# Patient Record
Sex: FEMALE | Race: WHITE | NOT HISPANIC OR LATINO | URBAN - METROPOLITAN AREA
[De-identification: names, ages, dates, MRNs, and addresses within clinical notes are randomized per-mention and may not be internally consistent; named-entity substitution may affect disease eponyms.]

---

## 2021-02-17 ENCOUNTER — OUTPATIENT (OUTPATIENT)
Dept: OUTPATIENT SERVICES | Facility: HOSPITAL | Age: 86
LOS: 1 days | End: 2021-02-17
Payer: MEDICARE

## 2021-02-17 DIAGNOSIS — R13.19 OTHER DYSPHAGIA: ICD-10-CM

## 2021-02-17 PROCEDURE — 74240 X-RAY XM UPR GI TRC 1CNTRST: CPT

## 2021-02-17 PROCEDURE — 74240 X-RAY XM UPR GI TRC 1CNTRST: CPT | Mod: 26

## 2022-01-18 ENCOUNTER — EMERGENCY (EMERGENCY)
Facility: HOSPITAL | Age: 87
LOS: 1 days | Discharge: HOME | End: 2022-01-18
Attending: EMERGENCY MEDICINE
Payer: MEDICARE

## 2022-01-18 VITALS
DIASTOLIC BLOOD PRESSURE: 79 MMHG | WEIGHT: 117.95 LBS | OXYGEN SATURATION: 66 % | TEMPERATURE: 98 F | RESPIRATION RATE: 16 BRPM | SYSTOLIC BLOOD PRESSURE: 174 MMHG | HEART RATE: 68 BPM | HEIGHT: 58 IN

## 2022-01-18 LAB
ALBUMIN SERPL ELPH-MCNC: 3.7 G/DL — SIGNIFICANT CHANGE UP (ref 3.3–5.2)
ALP SERPL-CCNC: 76 U/L — SIGNIFICANT CHANGE UP (ref 40–120)
ALT FLD-CCNC: 16 U/L — SIGNIFICANT CHANGE UP
ANION GAP SERPL CALC-SCNC: 15 MMOL/L — SIGNIFICANT CHANGE UP (ref 5–17)
AST SERPL-CCNC: 15 U/L — SIGNIFICANT CHANGE UP
BASOPHILS # BLD AUTO: 0.07 K/UL — SIGNIFICANT CHANGE UP (ref 0–0.2)
BASOPHILS NFR BLD AUTO: 1.1 % — SIGNIFICANT CHANGE UP (ref 0–2)
BILIRUB SERPL-MCNC: <0.2 MG/DL — LOW (ref 0.4–2)
BUN SERPL-MCNC: 33.9 MG/DL — HIGH (ref 8–20)
CALCIUM SERPL-MCNC: 8.9 MG/DL — SIGNIFICANT CHANGE UP (ref 8.6–10.2)
CHLORIDE SERPL-SCNC: 103 MMOL/L — SIGNIFICANT CHANGE UP (ref 98–107)
CO2 SERPL-SCNC: 24 MMOL/L — SIGNIFICANT CHANGE UP (ref 22–29)
CREAT SERPL-MCNC: 0.58 MG/DL — SIGNIFICANT CHANGE UP (ref 0.5–1.3)
EOSINOPHIL # BLD AUTO: 0.3 K/UL — SIGNIFICANT CHANGE UP (ref 0–0.5)
EOSINOPHIL NFR BLD AUTO: 4.7 % — SIGNIFICANT CHANGE UP (ref 0–6)
GLUCOSE SERPL-MCNC: 119 MG/DL — HIGH (ref 70–99)
HCT VFR BLD CALC: 33.9 % — LOW (ref 34.5–45)
HGB BLD-MCNC: 10.4 G/DL — LOW (ref 11.5–15.5)
IMM GRANULOCYTES NFR BLD AUTO: 0.6 % — SIGNIFICANT CHANGE UP (ref 0–1.5)
LYMPHOCYTES # BLD AUTO: 1.29 K/UL — SIGNIFICANT CHANGE UP (ref 1–3.3)
LYMPHOCYTES # BLD AUTO: 20 % — SIGNIFICANT CHANGE UP (ref 13–44)
MCHC RBC-ENTMCNC: 27.2 PG — SIGNIFICANT CHANGE UP (ref 27–34)
MCHC RBC-ENTMCNC: 30.7 GM/DL — LOW (ref 32–36)
MCV RBC AUTO: 88.5 FL — SIGNIFICANT CHANGE UP (ref 80–100)
MONOCYTES # BLD AUTO: 0.68 K/UL — SIGNIFICANT CHANGE UP (ref 0–0.9)
MONOCYTES NFR BLD AUTO: 10.5 % — SIGNIFICANT CHANGE UP (ref 2–14)
NEUTROPHILS # BLD AUTO: 4.07 K/UL — SIGNIFICANT CHANGE UP (ref 1.8–7.4)
NEUTROPHILS NFR BLD AUTO: 63.1 % — SIGNIFICANT CHANGE UP (ref 43–77)
PLATELET # BLD AUTO: 314 K/UL — SIGNIFICANT CHANGE UP (ref 150–400)
POTASSIUM SERPL-MCNC: 3.3 MMOL/L — LOW (ref 3.5–5.3)
POTASSIUM SERPL-SCNC: 3.3 MMOL/L — LOW (ref 3.5–5.3)
PROT SERPL-MCNC: 6.3 G/DL — LOW (ref 6.6–8.7)
RBC # BLD: 3.83 M/UL — SIGNIFICANT CHANGE UP (ref 3.8–5.2)
RBC # FLD: 15.5 % — HIGH (ref 10.3–14.5)
SODIUM SERPL-SCNC: 142 MMOL/L — SIGNIFICANT CHANGE UP (ref 135–145)
WBC # BLD: 6.45 K/UL — SIGNIFICANT CHANGE UP (ref 3.8–10.5)
WBC # FLD AUTO: 6.45 K/UL — SIGNIFICANT CHANGE UP (ref 3.8–10.5)

## 2022-01-18 PROCEDURE — 99284 EMERGENCY DEPT VISIT MOD MDM: CPT

## 2022-01-18 NOTE — ED PROVIDER NOTE - CARE PROVIDER_API CALL
Osman Mckeon)  Gastroenterology; Internal Medicine  [address], [address]  [place] [address]  Phone: [phone]  Fax: [phone]  Follow Up Time: 4-6 Days    Nick Taylor)  Surgery  General  [address] [place] [address]  Phone: [phone]  Fax: [phone]  Follow Up Time: 4-6 Days

## 2022-01-18 NOTE — ED PROVIDER NOTE - PATIENT PORTAL LINK FT
You can access the FollowMyHealth Patient Portal offered by [facility] by registering at the following website: [email or web address]. By joining [facility]’s FollowMyHealth portal, you will also be able to view your health information using other applications (apps) compatible with our system.

## 2022-01-18 NOTE — ED PROVIDER NOTE - PROVIDER TOKENS
PROVIDER:[TOKEN:[[id number]],FOLLOWUP:[4-6 Days]],PROVIDER:[TOKEN:[[id number]],FOLLOWUP:[4-6 Days]]

## 2022-01-18 NOTE — ED PROVIDER NOTE - PROGRESS NOTE DETAILS
Shivani GARIBAY: Leg significantly less swollen, DVT study negative. Ready for dc. Return precautions given.

## 2022-01-18 NOTE — ED PROVIDER NOTE - PHYSICAL EXAMINATION
Constitutional - well-developed. Head - NCAT. Airway patent. Eyes - PERRL. CV - RRR. no murmur. 2+ RLE swelling. nvi distally. Pulm - CTAB. Abd - soft, nt. no rebound. no guarding. Neuro - A&Ox3. strength 5/5 x4. sensation intact x4. normal gait. Skin - No rash. MSK - normal ROM.   Chaperone RN Erlinda - rectal with no blood on NANI. small abrasion perianally.  no external hemorrhoids.

## 2022-01-18 NOTE — ED PROVIDER NOTE - NSFOLLOWUPINSTRUCTIONS_ED_ALL_ED_FT
- Follow up with your doctor within 2-3 days.   - Bring results with you to the appointment.   - See information above for Gastroenterologist and Colorectal surgery.   - Return to the ED for any new or worsening symptoms.

## 2022-01-18 NOTE — ED PROVIDER NOTE - OBJECTIVE STATEMENT
Pt is an 85 yo F co rectal bleeding.  Pt's daughter states that for the past 3 wks patient has had blood in her diaper and when she has a BM and it has been progressively worsening.  the blood is bright red.  no clots.  no n/v. no fever.  Pt unable to provide any hx 2/2 dementia.  Pt's daughter also states that she noticed patient's R leg has been swollen recently.

## 2022-01-18 NOTE — ED PROVIDER NOTE - CLINICAL SUMMARY MEDICAL DECISION MAKING FREE TEXT BOX
labs reviewed.  Pt skin with breakdown likely causing bleeding but possibly internal hemorrhoids as well.  would recommend barrier cream and colace for d/c.  as for RLE edema.  US pending if neg to d/c. if positive plan to discuss r/b/a of AC in patient with local rectal bleeding and dementia with fall risk. Pt signed out to Dr. Parrish pending US

## 2022-01-18 NOTE — ED ADULT TRIAGE NOTE - CHIEF COMPLAINT QUOTE
Pt with rectal bleeding for the last few months that are getting worse and more frequent. She has needed iron infusions in the past. Daughter spoke to her GI doctor and was told to bring her here for evaluation. Pt's only complaint is weakness. Daughter states she is pale the last few days.

## 2022-01-19 VITALS
RESPIRATION RATE: 18 BRPM | HEART RATE: 72 BPM | OXYGEN SATURATION: 98 % | DIASTOLIC BLOOD PRESSURE: 76 MMHG | TEMPERATURE: 98 F | SYSTOLIC BLOOD PRESSURE: 141 MMHG

## 2022-01-19 PROBLEM — Z00.00 ENCOUNTER FOR PREVENTIVE HEALTH EXAMINATION: Status: ACTIVE | Noted: 2022-01-19

## 2022-01-19 PROCEDURE — 93971 EXTREMITY STUDY: CPT | Mod: 26,RT

## 2022-01-19 PROCEDURE — 86901 BLOOD TYPING SEROLOGIC RH(D): CPT

## 2022-01-19 PROCEDURE — 86900 BLOOD TYPING SEROLOGIC ABO: CPT

## 2022-01-19 PROCEDURE — 80053 COMPREHEN METABOLIC PANEL: CPT

## 2022-01-19 PROCEDURE — 36415 COLL VENOUS BLD VENIPUNCTURE: CPT

## 2022-01-19 PROCEDURE — 93971 EXTREMITY STUDY: CPT

## 2022-01-19 PROCEDURE — 85025 COMPLETE CBC W/AUTO DIFF WBC: CPT

## 2022-01-19 PROCEDURE — 99284 EMERGENCY DEPT VISIT MOD MDM: CPT | Mod: 25

## 2022-01-19 PROCEDURE — 86850 RBC ANTIBODY SCREEN: CPT

## 2022-01-21 ENCOUNTER — APPOINTMENT (OUTPATIENT)
Dept: COLORECTAL SURGERY | Facility: CLINIC | Age: 87
End: 2022-01-21
Payer: MEDICARE

## 2022-01-21 PROCEDURE — 99204 OFFICE O/P NEW MOD 45 MIN: CPT

## 2022-01-21 PROCEDURE — 46600 DIAGNOSTIC ANOSCOPY SPX: CPT

## 2022-01-21 RX ORDER — SIMVASTATIN 80 MG/1
TABLET, FILM COATED ORAL
Refills: 0 | Status: ACTIVE | COMMUNITY

## 2022-01-21 RX ORDER — METFORMIN HYDROCHLORIDE 625 MG/1
TABLET ORAL
Refills: 0 | Status: ACTIVE | COMMUNITY

## 2022-01-21 RX ORDER — DIAZEPAM 10 MG/2ML
INJECTION, SOLUTION INTRAMUSCULAR; INTRAVENOUS
Refills: 0 | Status: ACTIVE | COMMUNITY

## 2022-01-21 RX ORDER — METOPROLOL SUCCINATE 200 MG/1
TABLET, EXTENDED RELEASE ORAL
Refills: 0 | Status: ACTIVE | COMMUNITY

## 2022-01-21 RX ORDER — LEVOTHYROXINE SODIUM 0.17 MG/1
TABLET ORAL
Refills: 0 | Status: ACTIVE | COMMUNITY

## 2022-01-21 RX ORDER — DONEPEZIL HYDROCHLORIDE 23 MG/1
TABLET, FILM COATED ORAL
Refills: 0 | Status: ACTIVE | COMMUNITY

## 2022-01-21 NOTE — ASSESSMENT
[FreeTextEntry1] : 86 year old female with rectal bleeding found to have distal rectal mass on NANI, and anoscopy.\par \par Distal rectal mass\par Will plan for biopsy in the office, further plans pending pathology results

## 2022-01-21 NOTE — PHYSICAL EXAM
[Normal rectal exam] : exam was normal [Excoriation] : no perianal excoriation [Fistula] : no fistulas [Pilonidal Cyst] : no pilonidal cysts [Tender, Swollen] : nontender, non-swollen [Normal] : was normal [None] : there was no rectal abscess [___ Left Side] : a [unfilled] ~Ucm rectal mass was present on the left [Alert] : alert [Oriented to Person] : oriented to person [Oriented to Place] : oriented to place [Oriented to Time] : oriented to time [Calm] : calm [de-identified] : Soft, non-distended, non-tender [de-identified] : Normal external rectal exam. Palpable rectal mass on NANI in the left lateral region [de-identified] : NAD [de-identified] : Non-labored respirations [de-identified] : Normal rate

## 2022-01-21 NOTE — HISTORY OF PRESENT ILLNESS
[FreeTextEntry1] : 86 year F who presents for evaluation of bright red blood per rectum. Patient presents with her daughter. Patient has been experiencing bright red blood per rectum for > 1 month. BRBPR is spontaneous, happens during the day and at night, with no aggravating factors or associated symptoms. No other symptoms reported, denies N/V/F/C. Per daughter patient had a cardiac event during her last upper endoscopy, so she has not had a recent colonoscopy, but had a virtual colonoscopy. No records found.

## 2022-01-21 NOTE — PROCEDURE
[FreeTextEntry1] : Anoscopy\par \par I discussed the reason for the procedure, and the risks and benefits with the patient. Risks including bleeding and discomfort were discussed. The patient understood or discussion and would like to proceed with the procedure.\par \par After completing a digital rectal exam a well lubricated anoscope was gently inserted into the anus. Complete inspection was performed. No tenderness on insertion of the anoscope, and the procedure was tolerated well. No fissures, fistula openings, or enlarged hemorrhoids were identified.\par \par Findings: Distal left sided rectal mass\par

## 2022-01-25 ENCOUNTER — LABORATORY RESULT (OUTPATIENT)
Age: 87
End: 2022-01-25

## 2022-01-26 ENCOUNTER — APPOINTMENT (OUTPATIENT)
Dept: COLORECTAL SURGERY | Facility: CLINIC | Age: 87
End: 2022-01-26
Payer: MEDICARE

## 2022-01-26 VITALS
WEIGHT: 118 LBS | BODY MASS INDEX: 24.77 KG/M2 | RESPIRATION RATE: 15 BRPM | SYSTOLIC BLOOD PRESSURE: 162 MMHG | HEIGHT: 58 IN | OXYGEN SATURATION: 99 % | HEART RATE: 65 BPM | TEMPERATURE: 97.2 F | DIASTOLIC BLOOD PRESSURE: 74 MMHG

## 2022-01-26 DIAGNOSIS — K62.89 OTHER SPECIFIED DISEASES OF ANUS AND RECTUM: ICD-10-CM

## 2022-01-26 PROCEDURE — 99214 OFFICE O/P EST MOD 30 MIN: CPT | Mod: 25

## 2022-01-26 PROCEDURE — 46606 ANOSCOPY AND BIOPSY: CPT

## 2022-01-26 NOTE — PROCEDURE
[FreeTextEntry1] : Anoscopy\par \par I discussed the reason for the procedure, and the risks and benefits with the patient. Risks including bleeding and discomfort were discussed. The patient understood or discussion and would like to proceed with the procedure.\par \par After completing a digital rectal exam a well lubricated anoscope was gently inserted into the anus. Complete inspection was performed. No tenderness on insertion of the anoscope, and the procedure was tolerated well. No fissures, fistula openings, or enlarged hemorrhoids were identified.\par \par Findings: Rectal mass between the left lateral and anterior position. Mass biopsied in the office\par

## 2022-01-26 NOTE — ASSESSMENT
[FreeTextEntry1] : 86 year old female with rectal bleeding found to have distal rectal mass on NANI, and anoscopy.\par \par Distal rectal mass\par Biopsied in the office, further plans pending pathology results

## 2022-01-26 NOTE — PHYSICAL EXAM
[Normal rectal exam] : exam was normal [Normal] : was normal [None] : there was no rectal abscess [___ Left Side] : a [unfilled] ~Ucm rectal mass was present on the left [Alert] : alert [Oriented to Person] : oriented to person [Oriented to Place] : oriented to place [Oriented to Time] : oriented to time [Calm] : calm [Excoriation] : no perianal excoriation [Fistula] : no fistulas [Pilonidal Cyst] : no pilonidal cysts [Tender, Swollen] : nontender, non-swollen [de-identified] : Soft, non-distended, non-tender [de-identified] : Normal external rectal exam. Palpable rectal mass on NANI between the left lateral and anterior region. Mass is mobile and sitting adjacent to the sphincters. It does not feel fixed or involved with the sphincters [de-identified] : NAD [de-identified] : Non-labored respirations [de-identified] : Normal rate

## 2022-01-26 NOTE — HISTORY OF PRESENT ILLNESS
[FreeTextEntry1] : 01/26/2022 - Patient presents today for re-evaluation and biopsy of rectal mass\par \par 01/21/2022 - 86 year F who presents for evaluation of bright red blood per rectum. Patient presents with her daughter. Patient has been experiencing bright red blood per rectum for > 1 month. BRBPR is spontaneous, happens during the day and at night, with no aggravating factors or associated symptoms. No other symptoms reported, denies N/V/F/C. Per daughter patient had a cardiac event during her last upper endoscopy, so she has not had a recent colonoscopy, but had a virtual colonoscopy. No records found.

## 2022-01-27 ENCOUNTER — NON-APPOINTMENT (OUTPATIENT)
Age: 87
End: 2022-01-27

## 2022-02-03 ENCOUNTER — APPOINTMENT (OUTPATIENT)
Dept: CT IMAGING | Facility: CLINIC | Age: 87
End: 2022-02-03

## 2022-02-03 ENCOUNTER — APPOINTMENT (OUTPATIENT)
Dept: MRI IMAGING | Facility: CLINIC | Age: 87
End: 2022-02-03

## 2022-02-23 ENCOUNTER — APPOINTMENT (OUTPATIENT)
Dept: COLORECTAL SURGERY | Facility: CLINIC | Age: 87
End: 2022-02-23
Payer: MEDICARE

## 2022-02-23 VITALS
OXYGEN SATURATION: 99 % | BODY MASS INDEX: 24.77 KG/M2 | HEIGHT: 58 IN | DIASTOLIC BLOOD PRESSURE: 71 MMHG | SYSTOLIC BLOOD PRESSURE: 119 MMHG | RESPIRATION RATE: 15 BRPM | TEMPERATURE: 97.7 F | WEIGHT: 118 LBS | HEART RATE: 58 BPM

## 2022-02-23 DIAGNOSIS — K62.1 RECTAL POLYP: ICD-10-CM

## 2022-02-23 DIAGNOSIS — K62.5 HEMORRHAGE OF ANUS AND RECTUM: ICD-10-CM

## 2022-02-23 PROCEDURE — 99214 OFFICE O/P EST MOD 30 MIN: CPT

## 2022-02-23 NOTE — ASSESSMENT
[FreeTextEntry1] : 86 year old female severely deconditioned, with dementia who initially presented with rectal bleeding. She was found to have distal rectal mass that was biopsied and shown to be hyperplastic rectal polyp. Patient is doing well since discharge and has not had any major bleeding events. I discussed with her daughter that given her age, comorbidities surgical intervention though possible would pose significant morbidity and mortality risk. The patient will be seen and evaluated by her primary care physician and cardiologists to weigh in regarding the risks and benefits of any surgical intervention. If the patient is able to continue taking aspirin without further bleeding surgical intervention would not be necessary. However should bleeding continue or worsen the hyperplastic polyp will have to be removed.\par After discussion with her PCP and cardiologist a finalized plan will be made and communicated with the patient and her daughter.

## 2022-02-23 NOTE — PHYSICAL EXAM
[Alert] : alert [Oriented to Person] : oriented to person [Oriented to Place] : oriented to place [Oriented to Time] : oriented to time [Calm] : calm [Exam Deferred] : exam was deferred [de-identified] : Soft, non-distended, non-tender [de-identified] : NAD [de-identified] : Non-labored respirations [de-identified] : Normal rate

## 2022-02-23 NOTE — HISTORY OF PRESENT ILLNESS
[FreeTextEntry1] : 2/23/2022 - On patient's last visit with me in the office her rectal mass was biopsied the pathology of which resulted as hyperplastic rectal polyp. Approximately 5 days later I received a call that the patient was admitted to [facility] because of increased bright red blood per rectum. While at [facility] the patient under went several tests:\par \par CT C/A/P 1/30/22: "No evidence of acute or active gastrointestinal hemorrhage or metastatic disease. Small to moderate pericardial effusion. What is most likely multiple simple and hemorrhagic renal cysts bilaterally. Findings suspicious for a rectal mass consistent with provided clinical history."\par \par MRI 2/1/22: "Some density in the rectum which may reflect fecal material, hemorrhoids, or lobulated rectal mass not well assessed on this study."\par \par Colonoscopy 2/2/22: :Large almost circumferential flat polypoid lesion in the distal rectum with a single erosion. No active bleeding at time of procedure, but likely source of bleeding in this patient, biopsies obtained."\par \par Patient was switched from Plavix to Aspirin 325mg since discharge from the hospital. Her daughter reports occasional blood spots, but no significant bleeding. Patient is otherwise doing well and is here today to discussed possible surgical excision of this lesion.

## 2022-03-01 ENCOUNTER — EMERGENCY (EMERGENCY)
Facility: HOSPITAL | Age: 87
LOS: 1 days | Discharge: HOME | End: 2022-03-01
Attending: EMERGENCY MEDICINE
Payer: MEDICARE

## 2022-03-01 VITALS
WEIGHT: 121.92 LBS | RESPIRATION RATE: 20 BRPM | SYSTOLIC BLOOD PRESSURE: 157 MMHG | OXYGEN SATURATION: 98 % | HEART RATE: 76 BPM | TEMPERATURE: 98 F | HEIGHT: 58 IN | DIASTOLIC BLOOD PRESSURE: 72 MMHG

## 2022-03-01 VITALS
HEART RATE: 61 BPM | SYSTOLIC BLOOD PRESSURE: 151 MMHG | OXYGEN SATURATION: 99 % | RESPIRATION RATE: 18 BRPM | DIASTOLIC BLOOD PRESSURE: 71 MMHG

## 2022-03-01 LAB
ALBUMIN SERPL ELPH-MCNC: 3.8 G/DL — SIGNIFICANT CHANGE UP (ref 3.3–5.2)
ALP SERPL-CCNC: 93 U/L — SIGNIFICANT CHANGE UP (ref 40–120)
ALT FLD-CCNC: 16 U/L — SIGNIFICANT CHANGE UP
ANION GAP SERPL CALC-SCNC: 16 MMOL/L — SIGNIFICANT CHANGE UP (ref 5–17)
AST SERPL-CCNC: 30 U/L — SIGNIFICANT CHANGE UP
BASOPHILS # BLD AUTO: 0.05 K/UL — SIGNIFICANT CHANGE UP (ref 0–0.2)
BASOPHILS NFR BLD AUTO: 0.6 % — SIGNIFICANT CHANGE UP (ref 0–2)
BILIRUB SERPL-MCNC: <0.2 MG/DL — LOW (ref 0.4–2)
BUN SERPL-MCNC: 38.9 MG/DL — HIGH (ref 8–20)
CALCIUM SERPL-MCNC: 9.4 MG/DL — SIGNIFICANT CHANGE UP (ref 8.6–10.2)
CHLORIDE SERPL-SCNC: 104 MMOL/L — SIGNIFICANT CHANGE UP (ref 98–107)
CO2 SERPL-SCNC: 21 MMOL/L — LOW (ref 22–29)
CREAT SERPL-MCNC: 0.8 MG/DL — SIGNIFICANT CHANGE UP (ref 0.5–1.3)
EGFR: 71 ML/MIN/1.73M2 — SIGNIFICANT CHANGE UP
EOSINOPHIL # BLD AUTO: 0.29 K/UL — SIGNIFICANT CHANGE UP (ref 0–0.5)
EOSINOPHIL NFR BLD AUTO: 3.3 % — SIGNIFICANT CHANGE UP (ref 0–6)
GLUCOSE SERPL-MCNC: 153 MG/DL — HIGH (ref 70–99)
HCT VFR BLD CALC: 32.7 % — LOW (ref 34.5–45)
HGB BLD-MCNC: 9.9 G/DL — LOW (ref 11.5–15.5)
IMM GRANULOCYTES NFR BLD AUTO: 0.7 % — SIGNIFICANT CHANGE UP (ref 0–1.5)
LYMPHOCYTES # BLD AUTO: 1.01 K/UL — SIGNIFICANT CHANGE UP (ref 1–3.3)
LYMPHOCYTES # BLD AUTO: 11.5 % — LOW (ref 13–44)
MCHC RBC-ENTMCNC: 26.5 PG — LOW (ref 27–34)
MCHC RBC-ENTMCNC: 30.3 GM/DL — LOW (ref 32–36)
MCV RBC AUTO: 87.7 FL — SIGNIFICANT CHANGE UP (ref 80–100)
MONOCYTES # BLD AUTO: 0.68 K/UL — SIGNIFICANT CHANGE UP (ref 0–0.9)
MONOCYTES NFR BLD AUTO: 7.7 % — SIGNIFICANT CHANGE UP (ref 2–14)
NEUTROPHILS # BLD AUTO: 6.72 K/UL — SIGNIFICANT CHANGE UP (ref 1.8–7.4)
NEUTROPHILS NFR BLD AUTO: 76.2 % — SIGNIFICANT CHANGE UP (ref 43–77)
PLATELET # BLD AUTO: 419 K/UL — HIGH (ref 150–400)
POTASSIUM SERPL-MCNC: 4.5 MMOL/L — SIGNIFICANT CHANGE UP (ref 3.5–5.3)
POTASSIUM SERPL-SCNC: 4.5 MMOL/L — SIGNIFICANT CHANGE UP (ref 3.5–5.3)
PROT SERPL-MCNC: 6.7 G/DL — SIGNIFICANT CHANGE UP (ref 6.6–8.7)
RBC # BLD: 3.73 M/UL — LOW (ref 3.8–5.2)
RBC # FLD: 15.8 % — HIGH (ref 10.3–14.5)
SODIUM SERPL-SCNC: 141 MMOL/L — SIGNIFICANT CHANGE UP (ref 135–145)
TROPONIN T SERPL-MCNC: <0.01 NG/ML — SIGNIFICANT CHANGE UP (ref 0–0.06)
WBC # BLD: 8.81 K/UL — SIGNIFICANT CHANGE UP (ref 3.8–10.5)
WBC # FLD AUTO: 8.81 K/UL — SIGNIFICANT CHANGE UP (ref 3.8–10.5)

## 2022-03-01 PROCEDURE — 71046 X-RAY EXAM CHEST 2 VIEWS: CPT

## 2022-03-01 PROCEDURE — 85025 COMPLETE CBC W/AUTO DIFF WBC: CPT

## 2022-03-01 PROCEDURE — 93005 ELECTROCARDIOGRAM TRACING: CPT

## 2022-03-01 PROCEDURE — 99285 EMERGENCY DEPT VISIT HI MDM: CPT | Mod: 25

## 2022-03-01 PROCEDURE — 71046 X-RAY EXAM CHEST 2 VIEWS: CPT | Mod: 26

## 2022-03-01 PROCEDURE — 84484 ASSAY OF TROPONIN QUANT: CPT

## 2022-03-01 PROCEDURE — 80053 COMPREHEN METABOLIC PANEL: CPT

## 2022-03-01 PROCEDURE — 93010 ELECTROCARDIOGRAM REPORT: CPT

## 2022-03-01 PROCEDURE — 99285 EMERGENCY DEPT VISIT HI MDM: CPT

## 2022-03-01 PROCEDURE — 36415 COLL VENOUS BLD VENIPUNCTURE: CPT

## 2022-03-01 PROCEDURE — 94640 AIRWAY INHALATION TREATMENT: CPT

## 2022-03-01 RX ORDER — IPRATROPIUM/ALBUTEROL SULFATE 18-103MCG
3 AEROSOL WITH ADAPTER (GRAM) INHALATION ONCE
Refills: 0 | Status: COMPLETED | OUTPATIENT
Start: 2022-03-01 | End: 2022-03-01

## 2022-03-01 RX ADMIN — Medication 3 MILLILITER(S): at 22:22

## 2022-03-01 NOTE — ED PROVIDER NOTE - OBJECTIVE STATEMENT
87 year old female with hx of dementia, rectal tumor, CAD s/p stent, DM, CHF, COPD, and hypothyroidism who presents with difficulty breathing. Pt has been under palliative care as she is poor surgical candidate for surgical resection and has been on a general decline since January 2022. For the last few days she has been feeling dyspenic 87 year old female with hx of dementia, rectal tumor, CAD s/p stent, DM, CHF, COPD, and hypothyroidism who presents with difficulty breathing. Pt has been under palliative care as she is poor surgical candidate for surgical resection and has been on a general decline since January 2022. For the last few days she has been feeling dyspenic and tonight repeatedly told her daughter she felt like she could not breathe. Pt also seen earlier by home care RN who felt pt had decreased breath sounds RLL. Pt reports dry cough. No fevers, chills, chest pain, leg swelling increased from baseline, or other complaints.

## 2022-03-01 NOTE — ED PROVIDER NOTE - ATTENDING CONTRIBUTION TO CARE
Shivani: I performed a face to face bedside interview with patient regarding history of present illness, review of symptoms and past medical history. I completed an independent physical exam.  I have discussed patient's plan of care with resident.   I agree with note as stated above including HISTORY OF PRESENT ILLNESS, HIV, PAST MEDICAL/SURGICAL/FAMILY/SOCIAL HISTORY, ALLERGIES AND HOME MEDICATIONS, REVIEW OF SYSTEMS, PHYSICAL EXAM, MEDICAL DECISION MAKING and any PROGRESS NOTES during the time I functioned as the attending physician for this patient unless otherwise noted. My brief assessment is as follows: Shivani GARIBAY: 87F h/o dementia, CAD s/p tent, DM, CHF, COPD p/w SOB. Denies cough, fever, sick contacts. Patient well appearing, mild wheezing to lower lung fields. VSS. Plan for labs, duoneb, cxr, reassess.

## 2022-03-01 NOTE — ED PROVIDER NOTE - CLINICAL SUMMARY MEDICAL DECISION MAKING FREE TEXT BOX
Shivani GARIBAY: 87F h/o dementia, CAD s/p tent, DM, CHF, COPD p/w SOB. Denies cough, fever, sick contacts. Patient well appearing, mild wheezing to lower lung fields. VSS. Plan for labs, duoneb, cxr, reassess.

## 2022-03-01 NOTE — ED PROVIDER NOTE - NS ED ROS FT
Constitutional: no fever, no chills  Head: NC, AT   Eyes: no redness   ENMT: no nasal congestion/drainage, no sore throat   CV: no chest pain, +chronic edema  Resp: no cough, +dyspnea, +dry cough   GI: no abdominal pain, no nausea, no vomiting, no diarrhea  : no dysuria, no hematuria   Skin: no lesions, no rashes   Neuro: no LOC, no headache, no sensory deficits, no weakness

## 2022-03-01 NOTE — ED PROVIDER NOTE - PHYSICAL EXAMINATION
General: well appearing, NAD  Head: NC, AT  EENT: EOMI, no scleral icterus  Cardiac: RRR, no apparent murmurs, trace lower extremity edema b/l   Respiratory: CTABL, occasional wheeze, no respiratory distress   Abdomen: soft, ND, NT, nonperitonitic  MSK/Vascular: full ROM, soft compartments, warm extremities  Neuro: AAOx3, sensation to light touch intact  Psych: calm, cooperative

## 2022-03-01 NOTE — ED ADULT NURSE NOTE - OBJECTIVE STATEMENT
Pt in no apparent distress at this time. Airway patent, breathing spontaneous and nonlabored. Pt A&Ox2-3 resting in stretcher. Pt c/o     SOB , denies chest pain, daughter at bedside also req assist setting up home care. does not want placement.

## 2022-03-01 NOTE — ED PROVIDER NOTE - NSFOLLOWUPINSTRUCTIONS_ED_ALL_ED_FT
- Follow up with your doctor within 2-3 days.   - Bring results with you to the appointment.   - Return to the ED for any new or worsening symptoms.     Shortness of breath    Shortness of breath (dyspnea) means you have trouble breathing and could indicate a medical problem. Causes include lung disease, heart disease, low amount of red blood cells (anemia), poor physical fitness, being overweight, smoking, etc. Your health care provider today may not be able to find a cause for your shortness of breath after your exam. In this case, it is important to have a follow-up exam with your primary care physician as instructed. If medicines were prescribed, take them as directed for the full length of time directed. Refrain from tobacco products.    SEEK IMMEDIATE MEDICAL CARE IF YOU HAVE ANY OF THE FOLLOWING SYMPTOMS: worsening shortness of breath, chest pain, back pain, abdominal pain, fever, coughing up blood, lightheadedness/dizziness.

## 2022-03-01 NOTE — ED PROVIDER NOTE - PROGRESS NOTE DETAILS
Shivani GARIBAY: Patient feeling better, saturating 99% on RA. Ready for dc. Return precautions given.

## 2022-03-01 NOTE — ED PROVIDER NOTE - PATIENT PORTAL LINK FT
You can access the FollowMyHealth Patient Portal offered by [facility] by registering at the following website: [email or web address]. By joining [facility]’s FollowMyHealth portal, you will also be able to view your health information using other applications (apps) compatible with our system.

## 2022-07-11 ENCOUNTER — APPOINTMENT (OUTPATIENT)
Dept: VASCULAR SURGERY | Facility: CLINIC | Age: 87
End: 2022-07-11

## 2022-07-11 VITALS
OXYGEN SATURATION: 96 % | DIASTOLIC BLOOD PRESSURE: 78 MMHG | SYSTOLIC BLOOD PRESSURE: 130 MMHG | TEMPERATURE: 97.6 F | HEIGHT: 58 IN | HEART RATE: 65 BPM

## 2022-07-11 PROCEDURE — 93970 EXTREMITY STUDY: CPT

## 2022-07-11 PROCEDURE — 99203 OFFICE O/P NEW LOW 30 MIN: CPT

## 2022-07-11 RX ORDER — TRAZODONE HYDROCHLORIDE 50 MG/1
50 TABLET ORAL
Qty: 30 | Refills: 0 | Status: ACTIVE | COMMUNITY
Start: 2022-03-29

## 2022-07-11 RX ORDER — SERTRALINE 25 MG/1
25 TABLET, FILM COATED ORAL
Qty: 30 | Refills: 0 | Status: ACTIVE | COMMUNITY
Start: 2022-06-22

## 2022-07-11 RX ORDER — FAMOTIDINE 20 MG/1
20 TABLET, FILM COATED ORAL
Qty: 180 | Refills: 0 | Status: ACTIVE | COMMUNITY
Start: 2022-01-30

## 2022-07-11 RX ORDER — DILTIAZEM HYDROCHLORIDE 120 MG/1
120 CAPSULE, EXTENDED RELEASE ORAL
Qty: 90 | Refills: 0 | Status: ACTIVE | COMMUNITY
Start: 2021-11-07

## 2022-07-11 RX ORDER — LORAZEPAM 1 MG/1
1 TABLET ORAL
Qty: 60 | Refills: 0 | Status: ACTIVE | COMMUNITY
Start: 2022-03-09

## 2022-07-11 RX ORDER — QUETIAPINE FUMARATE 25 MG/1
25 TABLET ORAL
Qty: 90 | Refills: 0 | Status: ACTIVE | COMMUNITY
Start: 2022-04-28

## 2022-07-11 NOTE — PHYSICAL EXAM
[2+] : left 2+ [Ankle Swelling (On Exam)] : present [Varicose Veins Of Lower Extremities] : bilaterally [Ankle Swelling Bilaterally] : severe [] : present [Ankle Swelling On The Right] : mild [Skin Ulcer] : no ulcer [Calm] : calm [de-identified] : NAD. well appearing

## 2022-07-11 NOTE — ASSESSMENT
[FreeTextEntry1] : 88 yo female with BLE varicose veins with swelling, pain and inflammation. Venous duplex today demonstrates enlargement and reflux of the bilateral GSV, no DVT \par \par Pt counseled on results of duplex and above diagnosis.\par Pt  counseled to continue using compression stocking\par Plan for bilateral GSV Venaseal procedure. Discussed the risks and benefits of the procedure with the patient. All questions answered.\par \par A total of 30 minutes was spent with patient and coordinating care.\par

## 2022-07-11 NOTE — PROCEDURE
[FreeTextEntry1] : Studies: \par 7/11/22 BLE venous duplex: \par right: GSV enlarged with > 2 sec reflux. SSV enlarged with > 2 sec reflux.  No DVT \par left: GSV enlarged with > 2 sec reflux. No DVT

## 2022-07-11 NOTE — HISTORY OF PRESENT ILLNESS
[FreeTextEntry1] : New 88 yo female PMHx dementia, DM, arthritis, HLD, HTN, thyroid disease, presents with painful varicose veins and swelling in BLE. Pt has had large varicose veins for many years and they are recently becoming more painful. She has been using compression stockings for many years. She has dementia and often takes off her compression stockings during the day. She has swelling in her lower legs, worse on the right. She denies any recent fever or chills.

## 2022-08-23 ENCOUNTER — APPOINTMENT (OUTPATIENT)
Dept: VASCULAR SURGERY | Facility: CLINIC | Age: 87
End: 2022-08-23

## 2022-08-23 VITALS
OXYGEN SATURATION: 95 % | DIASTOLIC BLOOD PRESSURE: 70 MMHG | RESPIRATION RATE: 16 BRPM | SYSTOLIC BLOOD PRESSURE: 117 MMHG | HEIGHT: 58 IN | HEART RATE: 59 BPM | TEMPERATURE: 97.3 F

## 2022-08-23 VITALS
TEMPERATURE: 97.3 F | OXYGEN SATURATION: 95 % | RESPIRATION RATE: 16 BRPM | HEART RATE: 62 BPM | BODY MASS INDEX: 24.77 KG/M2 | HEIGHT: 58 IN | WEIGHT: 118 LBS | SYSTOLIC BLOOD PRESSURE: 117 MMHG | DIASTOLIC BLOOD PRESSURE: 70 MMHG

## 2022-08-23 PROCEDURE — 36482Z ENDOVEN THER CHEM ADHES 1ST: CUSTOM | Mod: LT

## 2022-08-23 NOTE — PROCEDURE
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 The risks discussed were inclusive of but not limited to infection, irritation at the site of infiltration of local anesthesia, and rare risk of deep venous thrombosis and pulmonary emboli. The patient agrees to proceed with the procedure. \par \par The patient was escorted into the procedure room and a time out called.\par \par The entire limb was prepped and draped in sterile fashion. Ultrasound guidance was used to localize the access site. 1% lidocaine was injected as a local anesthetic in the subcutaneous tissues at the target location in the leg. Using ultrasound guidance, access was gained at this location with the 19-gauge thin-walled access needle and followed by introduction of a short guidewire, location confirmed with ultrasound. A small, 3 mm incision was made at the access site to allow for introduction and placement of the 7 Fr x7cm introducer/dilator. The dilator and guidewire were removed. The 0.035 guidewire from the Venaseal kit was then introduced and positioned at the left saphenofemoral junction using ultrasound guidance. The 80 cm 7 Fr introducer sheath/dilator was positioned 5cm from the saphenofemoral junction. The guidewire and dilator were removed, and the remaining sheath was flushed with sterile saline, with the syringe remaining in place prior to the next steps. \par \par The cyanoacrylate adhesive was precisely primed into the 5 F delivery catheter and this catheter/syringe combination was attached within the dispenser gun. This “assembly” was introduced through the 7 F sheath and positioned 5 cm caudal of the saphenofemoral junction under ultrasound guidance. The steps from the IFU were followed for dispensing amounts, locations and compression times, e.g 2 aliquots proximally with 3 minutes of compression, and 1 aliquot every 3cm distally with 30 sec of compression along the course of the vessel. Following the last injection and compression sequence, the catheter and introducer sheath were pulled out from the access site. Hemostasis was achieved with manual compression and an adhesive bandage was applied to the incision. Ultrasound confirmed complete coaptation and closure of the treated segments of the left GSV, and the absence of any DVT at the saphenofemoral junction. \par \par Treatment length of the vein 24 cm.\par \par The drapes were removed, and the patient cleaned and prepared for discharge. Patient tolerated procedure well. Patient was given post-procedure instructions and follow up appointment was scheduled.  Post op ultrasound is scheduled.\par \par

## 2022-08-30 ENCOUNTER — APPOINTMENT (OUTPATIENT)
Dept: VASCULAR SURGERY | Facility: CLINIC | Age: 87
End: 2022-08-30

## 2022-08-30 VITALS
RESPIRATION RATE: 16 BRPM | DIASTOLIC BLOOD PRESSURE: 69 MMHG | HEART RATE: 60 BPM | SYSTOLIC BLOOD PRESSURE: 116 MMHG | OXYGEN SATURATION: 96 % | TEMPERATURE: 97.3 F

## 2022-08-30 PROCEDURE — 36482Z ENDOVEN THER CHEM ADHES 1ST: CUSTOM | Mod: RT

## 2022-08-30 PROCEDURE — 93971 EXTREMITY STUDY: CPT

## 2022-08-30 NOTE — PROCEDURE
[FreeTextEntry1] : right GSV venaseal  [FreeTextEntry2] : right GSV insufficiency with varicose veins and pain  [FreeTextEntry3] : I have discussed the risks of the procedure at length with the patient. The risks discussed were inclusive of but not limited to infection, irritation at the site of infiltration of local anesthesia, and rare risk of deep venous thrombosis and pulmonary emboli. The patient agrees to proceed with the procedure. \par \par The patient was escorted into the procedure room and a time out called.\par \par The entire limb was prepped and draped in sterile fashion. Ultrasound guidance was used to localize the access site. 1% lidocaine was injected as a local anesthetic in the subcutaneous tissues at the target location in the leg. Using ultrasound guidance, access was gained at this location with the 19-gauge thin-walled access needle and followed by introduction of a short guidewire, location confirmed with ultrasound. A small, 3 mm incision was made at the access site to allow for introduction and placement of the 7 Fr x7cm introducer/dilator. The dilator and guidewire were removed. The 0.035 guidewire from the Venaseal kit was then introduced and positioned at the left saphenofemoral junction using ultrasound guidance. The 80 cm 7 Fr introducer sheath/dilator was positioned 5cm from the saphenofemoral junction. The guidewire and dilator were removed, and the remaining sheath was flushed with sterile saline, with the syringe remaining in place prior to the next steps. \par \par The cyanoacrylate adhesive was precisely primed into the 5 F delivery catheter and this catheter/syringe combination was attached within the dispenser gun. This “assembly” was introduced through the 7 F sheath and positioned 5 cm caudal of the saphenofemoral junction under ultrasound guidance. The steps from the IFU were followed for dispensing amounts, locations and compression times, e.g 2 aliquots proximally with 3 minutes of compression, and 1 aliquot every 3cm distally with 30 sec of compression along the course of the vessel. Following the last injection and compression sequence, the catheter and introducer sheath were pulled out from the access site. Hemostasis was achieved with manual compression and an adhesive bandage was applied to the incision. Ultrasound confirmed complete coaptation and closure of the treated segments of the left GSV, and the absence of any DVT at the saphenofemoral junction. \par \par Treatment length of the vein 25 cm.\par \par The drapes were removed, and the patient cleaned and prepared for discharge. Patient tolerated procedure well. Patient was given post-procedure instructions and follow up appointment was scheduled.  Post op ultrasound is scheduled.\par \par

## 2022-09-06 ENCOUNTER — APPOINTMENT (OUTPATIENT)
Dept: VASCULAR SURGERY | Facility: CLINIC | Age: 87
End: 2022-09-06

## 2022-09-06 VITALS
SYSTOLIC BLOOD PRESSURE: 118 MMHG | DIASTOLIC BLOOD PRESSURE: 72 MMHG | RESPIRATION RATE: 16 BRPM | HEART RATE: 67 BPM | TEMPERATURE: 97.1 F | OXYGEN SATURATION: 96 %

## 2022-09-06 PROCEDURE — 99213 OFFICE O/P EST LOW 20 MIN: CPT

## 2022-09-06 RX ORDER — RIVAROXABAN 20 MG/1
20 TABLET, FILM COATED ORAL
Qty: 30 | Refills: 3 | Status: ACTIVE | COMMUNITY
Start: 2022-09-06 | End: 1900-01-01

## 2022-09-06 NOTE — PHYSICAL EXAM
[2+] : left 2+ [Ankle Swelling (On Exam)] : present [Varicose Veins Of Lower Extremities] : bilaterally [Ankle Swelling Bilaterally] : severe [] : bilaterally [Ankle Swelling On The Right] : mild [Calm] : calm [Skin Ulcer] : no ulcer [de-identified] : NAD. well appearing  [FreeTextEntry1] : thrombosed varicose veins in right calf, mild tenderness to palpation

## 2022-09-06 NOTE — ASSESSMENT
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 \par \par Pt counseled on results of duplex and above diagnosis.\par Pt  counseled to continue using compression stocking on left leg \par RLE UNNA boot applied\par Will start Xarelto 20 mg once daily \par RTC in 1 week for bandage change and to monitor symptoms \par \par A total of 20 minutes was spent with patient and coordinating care.\par

## 2022-09-06 NOTE — PROCEDURE
[FreeTextEntry1] : Studies: \par 7/11/22 BLE venous duplex: \par right: GSV enlarged with > 2 sec reflux. SSV enlarged with > 2 sec reflux.  No DVT \par left: GSV enlarged with > 2 sec reflux. No DVT\par \par 8/30/22 LLE venous duplex: GSV closed. No DVT\par \par 9/6/22 RLE venous duplex: GSV closed. thrombus protruding slightly into femoral vein. thrombosed varicose veins in right calf.

## 2022-09-06 NOTE — HISTORY OF PRESENT ILLNESS
[FreeTextEntry1] : 7/11/22: New 86 yo female PMHx dementia, DM, arthritis, HLD, HTN, thyroid disease, presents with painful varicose veins and swelling in BLE. Pt has had large varicose veins for many years and they are recently becoming more painful. She has been using compression stockings for many years. She has dementia and often takes off her compression stockings during the day. She has swelling in her lower legs, worse on the right. She denies any recent fever or chills.  \par \par 9/6/22: Pt has had severe pain in RLE since Sunday. She is having difficulty walking because of the pain. She denies any SOB or chest pain. \par \par PSHx: \par 8/30/22 RLE venaseal of GSV\par 8/23/22 LLE venaseal of GSV\par

## 2022-09-08 ENCOUNTER — APPOINTMENT (OUTPATIENT)
Dept: VASCULAR SURGERY | Facility: CLINIC | Age: 87
End: 2022-09-08

## 2022-09-13 ENCOUNTER — APPOINTMENT (OUTPATIENT)
Dept: VASCULAR SURGERY | Facility: CLINIC | Age: 87
End: 2022-09-13

## 2022-09-13 VITALS
TEMPERATURE: 97.1 F | DIASTOLIC BLOOD PRESSURE: 78 MMHG | OXYGEN SATURATION: 97 % | HEART RATE: 61 BPM | RESPIRATION RATE: 16 BRPM | HEIGHT: 58 IN | SYSTOLIC BLOOD PRESSURE: 143 MMHG

## 2022-09-13 PROCEDURE — 99213 OFFICE O/P EST LOW 20 MIN: CPT

## 2022-09-13 NOTE — PHYSICAL EXAM
[2+] : left 2+ [Ankle Swelling (On Exam)] : present [Varicose Veins Of Lower Extremities] : bilaterally [Ankle Swelling Bilaterally] : severe [] : bilaterally [Ankle Swelling On The Right] : mild [Calm] : calm [Skin Ulcer] : no ulcer [de-identified] : NAD. well appearing  [FreeTextEntry1] : thrombosed varicose veins in right calf, mild tenderness to palpation

## 2022-09-13 NOTE — HISTORY OF PRESENT ILLNESS
[FreeTextEntry1] : 7/11/22: New 88 yo female PMHx dementia, DM, arthritis, HLD, HTN, thyroid disease, presents with painful varicose veins and swelling in BLE. Pt has had large varicose veins for many years and they are recently becoming more painful. She has been using compression stockings for many years. She has dementia and often takes off her compression stockings during the day. She has swelling in her lower legs, worse on the right. She denies any recent fever or chills.  \par \par 9/6/22: Pt has had severe pain in RLE since Sunday. She is having difficulty walking because of the pain. She denies any SOB or chest pain. \par \par 9/6/22: Pt had RLE UNNA boot on for the past week. She is complaining about pain less. She tried to take the UNNA boot off herself, daughter stopped her from removing the bandage. She denies any SOB or chest pain. \par \par PSHx: \par 8/30/22 RLE venaseal of GSV\par 8/23/22 LLE venaseal of GSV\par

## 2022-09-13 NOTE — ASSESSMENT
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 \par \par Pt counseled on results of duplex and above diagnosis.\par Pt  counseled to continue using compression stocking on left leg \par RLE UNNA boot applied\par Continue  Xarelto 20 mg once daily \par RTC in 1 week for bandage change and for repeat RLE venous duplex \par \par A total of 20 minutes was spent with patient and coordinating care.\par

## 2022-09-20 ENCOUNTER — APPOINTMENT (OUTPATIENT)
Dept: VASCULAR SURGERY | Facility: CLINIC | Age: 87
End: 2022-09-20

## 2022-09-20 VITALS
SYSTOLIC BLOOD PRESSURE: 103 MMHG | DIASTOLIC BLOOD PRESSURE: 68 MMHG | OXYGEN SATURATION: 98 % | RESPIRATION RATE: 16 BRPM | TEMPERATURE: 97.9 F | HEART RATE: 59 BPM | HEIGHT: 58 IN

## 2022-09-20 DIAGNOSIS — I80.9 PHLEBITIS AND THROMBOPHLEBITIS OF UNSPECIFIED SITE: ICD-10-CM

## 2022-09-20 PROCEDURE — 99213 OFFICE O/P EST LOW 20 MIN: CPT

## 2022-09-20 NOTE — PHYSICAL EXAM
[2+] : left 2+ [Ankle Swelling (On Exam)] : present [Varicose Veins Of Lower Extremities] : bilaterally [Ankle Swelling Bilaterally] : severe [] : bilaterally [Ankle Swelling On The Right] : mild [Calm] : calm [Skin Ulcer] : no ulcer [de-identified] : NAD. well appearing  [FreeTextEntry1] : thrombosed varicose veins in right calf, mild tenderness to palpation

## 2022-09-20 NOTE — PROCEDURE
[FreeTextEntry1] : Studies: \par 7/11/22 BLE venous duplex: \par right: GSV enlarged with > 2 sec reflux. SSV enlarged with > 2 sec reflux.  No DVT \par left: GSV enlarged with > 2 sec reflux. No DVT\par \par 8/30/22 LLE venous duplex: GSV closed. No DVT\par \par 9/6/22 RLE venous duplex: GSV closed. thrombus protruding slightly into femoral vein. thrombosed varicose veins in right calf. \par \par 9/20/22 RLE venous duplex: GSV closed. No DVT. EGIT has resolved

## 2022-09-20 NOTE — ASSESSMENT
[FreeTextEntry1] : 88 yo female with thrombophlebitis of right leg after GSV venaseal procedure. phlebitis is improving and EGIT has resolved. \par \par Pt counseled on results of duplex and above diagnosis.\par Pt  counseled to continue using compression stocking on legs bilaterally \par Can stop Xarelto 20 mg once daily \par RTC in 4-6 weeks to monitor symptoms \par \par A total of 20 minutes was spent with patient and coordinating care.\par

## 2022-09-20 NOTE — HISTORY OF PRESENT ILLNESS
[FreeTextEntry1] : 7/11/22: New 88 yo female PMHx dementia, DM, arthritis, HLD, HTN, thyroid disease, presents with painful varicose veins and swelling in BLE. Pt has had large varicose veins for many years and they are recently becoming more painful. She has been using compression stockings for many years. She has dementia and often takes off her compression stockings during the day. She has swelling in her lower legs, worse on the right. She denies any recent fever or chills.  \par \par 9/6/22: Pt has had severe pain in RLE since Sunday. She is having difficulty walking because of the pain. She denies any SOB or chest pain. \par \par 9/6/22: Pt had RLE UNNA boot on for the past week. She is complaining about pain less. She tried to take the UNNA boot off herself, daughter stopped her from removing the bandage. She denies any SOB or chest pain. \par \par 9/20/22: Pt had RLE UNNA boot on for the past week. She is complaining about pain less. She has compression stockings at home but the daughter states she never keeps them on.  She denies any SOB or chest pain. \par \par PSHx: \par 8/30/22 RLE venaseal of GSV\par 8/23/22 LLE venaseal of GSV\par

## 2022-11-01 ENCOUNTER — APPOINTMENT (OUTPATIENT)
Dept: VASCULAR SURGERY | Facility: CLINIC | Age: 87
End: 2022-11-01

## 2022-11-01 VITALS
TEMPERATURE: 97.6 F | RESPIRATION RATE: 16 BRPM | OXYGEN SATURATION: 97 % | SYSTOLIC BLOOD PRESSURE: 151 MMHG | HEART RATE: 55 BPM | DIASTOLIC BLOOD PRESSURE: 86 MMHG | HEIGHT: 58 IN

## 2022-11-01 DIAGNOSIS — I83.899 VARICOSE VEINS OF UNSPECIFIED LOWER EXTREMITY WITH OTHER COMPLICATIONS: ICD-10-CM

## 2022-11-01 PROCEDURE — 99213 OFFICE O/P EST LOW 20 MIN: CPT

## 2022-11-01 NOTE — ASSESSMENT
[FreeTextEntry1] : 88 yo female with thrombophlebitis of right leg after GSV venaseal procedure. phlebitis is improving and EGIT has resolved. \par \par Pt counseled on results of duplex and above diagnosis.\par Pt  counseled to continue using compression stocking on legs bilaterally \par RTC PRN\par \par A total of 20 minutes was spent with patient and coordinating care.\par

## 2022-11-01 NOTE — HISTORY OF PRESENT ILLNESS
[FreeTextEntry1] : 7/11/22: New 88 yo female PMHx dementia, DM, arthritis, HLD, HTN, thyroid disease, presents with painful varicose veins and swelling in BLE. Pt has had large varicose veins for many years and they are recently becoming more painful. She has been using compression stockings for many years. She has dementia and often takes off her compression stockings during the day. She has swelling in her lower legs, worse on the right. She denies any recent fever or chills.  \par \par 9/6/22: Pt has had severe pain in RLE since Sunday. She is having difficulty walking because of the pain. She denies any SOB or chest pain. \par \par 9/6/22: Pt had RLE UNNA boot on for the past week. She is complaining about pain less. She tried to take the UNNA boot off herself, daughter stopped her from removing the bandage. She denies any SOB or chest pain. \par \par 9/20/22: Pt had RLE UNNA boot on for the past week. She is complaining about pain less. She has compression stockings at home but the daughter states she never keeps them on.  She denies any SOB or chest pain. \par \par 11/1/22: Pt doing well since last visit. She still has some tenderness in her right medial calf area. She denies any fever or chills, SOB or chest pain. \par \par PSHx: \par 8/30/22 RLE venaseal of GSV\par 8/23/22 LLE venaseal of GSV\par

## 2022-11-01 NOTE — PHYSICAL EXAM
[2+] : left 2+ [Ankle Swelling (On Exam)] : present [Ankle Swelling Bilaterally] : severe [Varicose Veins Of Lower Extremities] : bilaterally [] : bilaterally [Ankle Swelling On The Right] : mild [Calm] : calm [Skin Ulcer] : no ulcer [de-identified] : NAD. well appearing  [FreeTextEntry1] : thrombosed varicose veins in right calf, mild tenderness to palpation

## 2025-03-12 NOTE — ED ADULT TRIAGE NOTE - HAVE YOU HAD A FIRST COVID-19 BOOSTER?
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     
Yes